# Patient Record
Sex: MALE | Race: BLACK OR AFRICAN AMERICAN | NOT HISPANIC OR LATINO
[De-identification: names, ages, dates, MRNs, and addresses within clinical notes are randomized per-mention and may not be internally consistent; named-entity substitution may affect disease eponyms.]

---

## 2019-01-22 PROBLEM — Z00.00 ENCOUNTER FOR PREVENTIVE HEALTH EXAMINATION: Status: ACTIVE | Noted: 2019-01-22

## 2019-02-07 ENCOUNTER — APPOINTMENT (OUTPATIENT)
Dept: UROLOGY | Facility: CLINIC | Age: 54
End: 2019-02-07
Payer: COMMERCIAL

## 2019-02-07 VITALS
SYSTOLIC BLOOD PRESSURE: 129 MMHG | DIASTOLIC BLOOD PRESSURE: 85 MMHG | BODY MASS INDEX: 27.46 KG/M2 | RESPIRATION RATE: 14 BRPM | HEIGHT: 74 IN | WEIGHT: 214 LBS | HEART RATE: 67 BPM

## 2019-02-07 DIAGNOSIS — Z87.448 PERSONAL HISTORY OF OTHER DISEASES OF URINARY SYSTEM: ICD-10-CM

## 2019-02-07 DIAGNOSIS — Z78.9 OTHER SPECIFIED HEALTH STATUS: ICD-10-CM

## 2019-02-07 DIAGNOSIS — R80.9 PROTEINURIA, UNSPECIFIED: ICD-10-CM

## 2019-02-07 DIAGNOSIS — Z87.891 PERSONAL HISTORY OF NICOTINE DEPENDENCE: ICD-10-CM

## 2019-02-07 LAB
BILIRUB UR QL STRIP: NORMAL
CLARITY UR: CLEAR
COLLECTION METHOD: NORMAL
GLUCOSE UR-MCNC: NORMAL
HCG UR QL: 0.2 EU/DL
HGB UR QL STRIP.AUTO: NORMAL
KETONES UR-MCNC: NORMAL
LEUKOCYTE ESTERASE UR QL STRIP: NORMAL
NITRITE UR QL STRIP: NORMAL
PH UR STRIP: 7.5
PROT UR STRIP-MCNC: 100
SP GR UR STRIP: 1.01

## 2019-02-07 PROCEDURE — 51798 US URINE CAPACITY MEASURE: CPT

## 2019-02-07 PROCEDURE — 81003 URINALYSIS AUTO W/O SCOPE: CPT | Mod: QW

## 2019-02-07 PROCEDURE — 99204 OFFICE O/P NEW MOD 45 MIN: CPT | Mod: 25

## 2019-02-07 NOTE — LETTER BODY
[Dear  ___] : Dear  [unfilled], [Consult Letter:] : I had the pleasure of evaluating your patient, [unfilled]. [( Thank you for referring [unfilled] for consultation for _____ )] : Thank you for referring [unfilled] for consultation for [unfilled] [Please see my note below.] : Please see my note below. [Consult Closing:] : Thank you very much for allowing me to participate in the care of this patient.  If you have any questions, please do not hesitate to contact me. [Sincerely,] : Sincerely, [FreeTextEntry3] : Brad Jones MD\par  of Urology\par [facility]\par \par Offices:\par [facility] @\par [address], [place] [address]\par and\par [address], [place] [address], [address]\par and\par [address] [address]\par \par TEL: [phone]\par FAX: [phone]

## 2019-02-07 NOTE — PHYSICAL EXAM
[General Appearance - Well Developed] : well developed [Normal Appearance] : normal appearance [General Appearance - In No Acute Distress] : no acute distress [] : no respiratory distress [Abdomen Soft] : soft [Abdomen Tenderness] : non-tender [Abdomen Mass (___ Cm)] : no abdominal mass palpated [Costovertebral Angle Tenderness] : no ~M costovertebral angle tenderness [Urethral Meatus] : meatus normal [Penis Abnormality] : normal uncircumcised penis [Scrotum] : the scrotum was normal [Epididymis] : the epididymides were normal [Testes Tenderness] : no tenderness of the testes [Testes Mass (___cm)] : there were no testicular masses [FreeTextEntry1] : Prostate partially palpated, non tender, no nodule in the palpated part  [Normal Station and Gait] : the gait and station were normal for the patient's age [Skin Color & Pigmentation] : normal skin color and pigmentation [No Focal Deficits] : no focal deficits [Oriented To Time, Place, And Person] : oriented to person, place, and time [No Palpable Adenopathy] : no palpable adenopathy

## 2019-02-07 NOTE — HISTORY OF PRESENT ILLNESS
[FreeTextEntry1] : 54 yo male presents for Microhematuria. \par Recently had urine test and was told has microscopic hematuria. \par Denies gross hematuria, no history of kidney stones or recurrent urinary tract infections. \par Smoker- past, 0.25 PPD for few years, quit- 28 years ago. \par Occupational exposure- no. \par Reports reasonable stream, urinates every few hours or so during the day depending on fluid: water, coffee/tea and soda intake. No nocturia. \par Denies hesitancy, straining, intermittency, urgency, incontinence, sense of incomplete emptying.\par Denies dysuria, lower abdominal or flank pain, fever, chills or rigors.\par Normal erections(5/5)and libido.\par \par

## 2019-02-07 NOTE — ASSESSMENT
[FreeTextEntry1] : Reviewed outside records. \par \par Benign Prostatic Hyperplasia:\par No bothersome lower urinary tract symptoms and minimal post void residual. \par \par PSA Screening:\par Discussed PSA screening and latest recommendations/guidelines- USPTF and AUA. \par Patient does want to continue PSA screening.\par \par Microhematuria:\par Discussed the differential diagnosis of hematuria including benign and malignant pathology- including but not limited to nephrolithiasis, bladder stone, urinary tract infection, glomerular disease, renal cancer, bladder cancer, prostate cancer. We also discussed the chance that workup will not reveal a source for the bleeding. The patient understands that the hematuria could be from an upper tract (kidney or ureter) or lower tract (bladder, urethra, prostate) and that workup includes imaging and direct visualization of all of these.\par \par Will proceed with work up with Urinalysis with microscopy, Urine culture, Urine cytology, CT Urogram and Cystoscopy. \par \par Return to office after CT scan.

## 2019-02-08 LAB
APPEARANCE: CLEAR
BACTERIA: NEGATIVE
BILIRUBIN URINE: NEGATIVE
BLOOD URINE: NEGATIVE
COLOR: YELLOW
GLUCOSE QUALITATIVE U: NEGATIVE MG/DL
KETONES URINE: NEGATIVE
LEUKOCYTE ESTERASE URINE: NEGATIVE
MICROSCOPIC-UA: NORMAL
NITRITE URINE: NEGATIVE
PH URINE: 7.5
PROTEIN URINE: 30 MG/DL
PSA SERPL-MCNC: 0.86 NG/ML
RED BLOOD CELLS URINE: 0 /HPF
SPECIFIC GRAVITY URINE: 1.01
SQUAMOUS EPITHELIAL CELLS: 0 /HPF
UROBILINOGEN URINE: NEGATIVE MG/DL
WHITE BLOOD CELLS URINE: 0 /HPF

## 2019-02-09 LAB — BACTERIA UR CULT: NORMAL

## 2019-02-19 ENCOUNTER — APPOINTMENT (OUTPATIENT)
Dept: CT IMAGING | Facility: CLINIC | Age: 54
End: 2019-02-19
Payer: COMMERCIAL

## 2019-02-19 ENCOUNTER — OUTPATIENT (OUTPATIENT)
Dept: OUTPATIENT SERVICES | Facility: HOSPITAL | Age: 54
LOS: 1 days | End: 2019-02-19
Payer: COMMERCIAL

## 2019-02-19 DIAGNOSIS — R31.29 OTHER MICROSCOPIC HEMATURIA: ICD-10-CM

## 2019-02-19 PROCEDURE — 74178 CT ABD&PLV WO CNTR FLWD CNTR: CPT | Mod: 26

## 2019-02-19 PROCEDURE — 74178 CT ABD&PLV WO CNTR FLWD CNTR: CPT

## 2019-03-14 ENCOUNTER — APPOINTMENT (OUTPATIENT)
Dept: UROLOGY | Facility: CLINIC | Age: 54
End: 2019-03-14
Payer: MEDICARE

## 2019-03-14 VITALS
HEIGHT: 74 IN | HEART RATE: 71 BPM | BODY MASS INDEX: 27.46 KG/M2 | SYSTOLIC BLOOD PRESSURE: 136 MMHG | WEIGHT: 214 LBS | DIASTOLIC BLOOD PRESSURE: 80 MMHG

## 2019-03-14 DIAGNOSIS — R31.29 OTHER MICROSCOPIC HEMATURIA: ICD-10-CM

## 2019-03-14 PROCEDURE — 52000 CYSTOURETHROSCOPY: CPT

## 2019-03-14 PROCEDURE — 99214 OFFICE O/P EST MOD 30 MIN: CPT | Mod: 25

## 2019-03-14 NOTE — ASSESSMENT
[FreeTextEntry1] : Renal cyst:\par Discussed that Renal cysts are a common finding on routine radiological studies. Autopsy studies in patients over the age of 50 reveal greater than a 50% chance of having at least one simple renal cyst.\par And that renal cysts may be classified as simple or complex depending how they look on imaging. Discussed complexity of renal cysts and its implications as regards malignancy. \par Simple Renal cyst. No further work up is recommended. \par \par PSA Screening:\par PSA within normal limits. \par Continue PSA screening.\par \par Microhematuria:\par Discussed work up of hematuria so far. Recommended Cystoscopy. Discussed risks and benefits. \par Patient agreeable. Informed consent obtained. \par On Cystoscopy today- Mild bilobar prostatic enlargement. \par \par Recommended annual Urinalysis and if persistent repeat work up in 3-5 years or sooner if clinically indicated. \par Non  findings were discussed and asked Pt to discuss them with PCP and respective physicians. Pt was given copy of the CT scan. \par \par Follow with Primary care physician. \par Return to clinic as needed.

## 2019-03-14 NOTE — HISTORY OF PRESENT ILLNESS
[FreeTextEntry1] : 54 yo male presents for follow up> \par No new complaint. \par Denies dysuria, hematuria, lower abdominal or flank pain, fever, chills or rigors. \par \par Initially seen for Microhematuria. \par Denied gross hematuria, no history of kidney stones or recurrent urinary tract infections. \par Smoker- past, 0.25 PPD for few years, quit- 28 years ago. \par Occupational exposure- no. \par Reported reasonable stream, urinates every few hours or so during the day depending on fluid: water, coffee/tea and soda intake. No nocturia. \par Denied hesitancy, straining, intermittency, urgency, incontinence, sense of incomplete emptying.\par \par Normal erections(5/5)and libido.\par \par

## 2020-03-11 ENCOUNTER — APPOINTMENT (OUTPATIENT)
Dept: UROLOGY | Facility: CLINIC | Age: 55
End: 2020-03-11
Payer: COMMERCIAL

## 2020-03-11 VITALS
BODY MASS INDEX: 28.88 KG/M2 | OXYGEN SATURATION: 96 % | SYSTOLIC BLOOD PRESSURE: 125 MMHG | HEART RATE: 68 BPM | RESPIRATION RATE: 14 BRPM | HEIGHT: 74 IN | DIASTOLIC BLOOD PRESSURE: 75 MMHG | WEIGHT: 225 LBS

## 2020-03-11 PROCEDURE — 99214 OFFICE O/P EST MOD 30 MIN: CPT

## 2020-03-11 NOTE — PHYSICAL EXAM
[General Appearance - Well Developed] : well developed [Normal Appearance] : normal appearance [General Appearance - In No Acute Distress] : no acute distress [Abdomen Soft] : soft [Abdomen Tenderness] : non-tender [Abdomen Mass (___ Cm)] : no abdominal mass palpated [Costovertebral Angle Tenderness] : no ~M costovertebral angle tenderness [Skin Color & Pigmentation] : normal skin color and pigmentation [FreeTextEntry1] : normal peripheral circulation  [] : no respiratory distress [Oriented To Time, Place, And Person] : oriented to person, place, and time [Normal Station and Gait] : the gait and station were normal for the patient's age [No Focal Deficits] : no focal deficits

## 2020-03-11 NOTE — HISTORY OF PRESENT ILLNESS
[FreeTextEntry1] : 53 yo male presents for Renal cyst. \par Had Renal Ultrasound for history of Renal cyst and was asked to follow with Urology. \par Reports reasonable stream, urinates every few hours or so during the day depending on fluid: water, coffee/tea and soda intake. Nocturia 0-1 x. \par Denies hesitancy, straining, intermittency, urgency, incontinence, sense of incomplete emptying.\par \par Negative work up Microhematuria including Cystoscopy in March 2019 except Enlarged Prostate and Renal cyst. \par \par Initially seen for Microhematuria. \par Denied gross hematuria, no history of kidney stones or recurrent urinary tract infections. \par Smoker- past, 0.25 PPD for few years, quit- 28 years ago. \par Occupational exposure- no. \par Reported reasonable stream, urinates every few hours or so during the day depending on fluid: water, coffee/tea and soda intake. No nocturia. \par Denied hesitancy, straining, intermittency, urgency, incontinence, sense of incomplete emptying.\par \par Normal erections(5/5)and libido.\par \par

## 2020-03-11 NOTE — LETTER BODY
[Dear  ___] : Dear  [unfilled], [Consult Letter:] : I had the pleasure of evaluating your patient, [unfilled]. [( Thank you for referring [unfilled] for consultation for _____ )] : Thank you for referring [unfilled] for consultation for [unfilled] [Please see my note below.] : Please see my note below. [Consult Closing:] : Thank you very much for allowing me to participate in the care of this patient.  If you have any questions, please do not hesitate to contact me. [Sincerely,] : Sincerely, [FreeTextEntry3] : Brad Jones MD\par  of Urology\par [facility]\par \par Offices:\par [facility] @\par [address], [place] [address]\par and\par [address] [facility], [place] [address], [address]\par and\par [address] [address]\par \par TEL: [phone]\par FAX: [phone]

## 2020-03-11 NOTE — ASSESSMENT
[FreeTextEntry1] : Reviewed outside records. \par Slight increase in size of Renal cyst and being reported septated. \par \par Renal cyst:\par Discussed that Renal cysts are a common finding on routine radiological studies. Autopsy studies in patients over the age of 50 reveal greater than a 50% chance of having at least one simple renal cyst.\par And that renal cysts may be classified as simple or complex depending how they look on imaging. Discussed complexity of renal cysts and its implications as regards malignancy. \par Will monitor with periodic Imaging. \par \par CT scan before follow up appointment.\par Return to office in 6 months or sooner if any issues. \par

## 2020-08-20 ENCOUNTER — APPOINTMENT (OUTPATIENT)
Dept: CT IMAGING | Facility: CLINIC | Age: 55
End: 2020-08-20
Payer: MEDICARE

## 2020-08-20 ENCOUNTER — RESULT REVIEW (OUTPATIENT)
Age: 55
End: 2020-08-20

## 2020-08-20 ENCOUNTER — OUTPATIENT (OUTPATIENT)
Dept: OUTPATIENT SERVICES | Facility: HOSPITAL | Age: 55
LOS: 1 days | End: 2020-08-20
Payer: COMMERCIAL

## 2020-08-20 DIAGNOSIS — Z00.8 ENCOUNTER FOR OTHER GENERAL EXAMINATION: ICD-10-CM

## 2020-08-20 DIAGNOSIS — N28.1 CYST OF KIDNEY, ACQUIRED: ICD-10-CM

## 2020-08-20 PROCEDURE — 74160 CT ABDOMEN W/CONTRAST: CPT | Mod: 26

## 2020-08-20 PROCEDURE — 74160 CT ABDOMEN W/CONTRAST: CPT

## 2020-08-20 PROCEDURE — 82565 ASSAY OF CREATININE: CPT

## 2020-09-16 ENCOUNTER — APPOINTMENT (OUTPATIENT)
Dept: UROLOGY | Facility: CLINIC | Age: 55
End: 2020-09-16
Payer: MEDICARE

## 2020-09-16 VITALS
HEART RATE: 56 BPM | BODY MASS INDEX: 27.46 KG/M2 | WEIGHT: 214 LBS | DIASTOLIC BLOOD PRESSURE: 88 MMHG | SYSTOLIC BLOOD PRESSURE: 145 MMHG | OXYGEN SATURATION: 99 % | HEIGHT: 74 IN

## 2020-09-16 PROCEDURE — 99214 OFFICE O/P EST MOD 30 MIN: CPT

## 2020-09-22 NOTE — PHYSICAL EXAM
[Normal Appearance] : normal appearance [General Appearance - In No Acute Distress] : no acute distress [Skin Color & Pigmentation] : normal skin color and pigmentation [FreeTextEntry1] : normal peripheral circulation  [] : no respiratory distress [Oriented To Time, Place, And Person] : oriented to person, place, and time [Normal Station and Gait] : the gait and station were normal for the patient's age

## 2020-09-22 NOTE — LETTER BODY
[Dear  ___] : Dear  [unfilled], [Courtesy Letter:] : I had the pleasure of seeing your patient, [unfilled], in my office today. [Please see my note below.] : Please see my note below. [Sincerely,] : Sincerely, [FreeTextEntry3] : Brad Jones MD\par  of Urology\par [facility]\par \par Offices:\par [facility] @\par [address], [place] [address]\par and\par [address] [facility], [place] [address], [address]\par and\par [address] [address]\par \par TEL: [phone]\par FAX: [phone]

## 2020-09-22 NOTE — HISTORY OF PRESENT ILLNESS
[FreeTextEntry1] : 56 yo male presents for follow up.\par No new complaint. \par Had CT scan. \par \par Initially seen for Renal cyst. \par Had Renal Ultrasound for history of Renal cyst and was asked to follow with Urology. \par Reports reasonable stream, urinates every few hours or so during the day depending on fluid: water, coffee/tea and soda intake. Nocturia 0-1 x. \par Denies hesitancy, straining, intermittency, urgency, incontinence, sense of incomplete emptying.\par \par Negative work up Microhematuria including Cystoscopy in March 2019 except Enlarged Prostate and Renal cyst. \par \par Initially seen for Microhematuria. \par Denied gross hematuria, no history of kidney stones or recurrent urinary tract infections. \par Smoker- past, 0.25 PPD for few years, quit- 28 years ago. \par Occupational exposure- no. \par Reported reasonable stream, urinates every few hours or so during the day depending on fluid: water, coffee/tea and soda intake. No nocturia. \par Denied hesitancy, straining, intermittency, urgency, incontinence, sense of incomplete emptying.\par \par Normal erections(5/5)and libido.\par \par

## 2020-09-30 ENCOUNTER — TRANSCRIPTION ENCOUNTER (OUTPATIENT)
Age: 55
End: 2020-09-30

## 2021-09-15 ENCOUNTER — APPOINTMENT (OUTPATIENT)
Dept: UROLOGY | Facility: CLINIC | Age: 56
End: 2021-09-15
Payer: MEDICARE

## 2021-09-15 VITALS
SYSTOLIC BLOOD PRESSURE: 139 MMHG | DIASTOLIC BLOOD PRESSURE: 84 MMHG | BODY MASS INDEX: 27.59 KG/M2 | HEIGHT: 74 IN | HEART RATE: 67 BPM | OXYGEN SATURATION: 99 % | WEIGHT: 215 LBS | TEMPERATURE: 97.3 F

## 2021-09-15 DIAGNOSIS — Z12.5 ENCOUNTER FOR SCREENING FOR MALIGNANT NEOPLASM OF PROSTATE: ICD-10-CM

## 2021-09-15 DIAGNOSIS — N28.1 CYST OF KIDNEY, ACQUIRED: ICD-10-CM

## 2021-09-15 DIAGNOSIS — N13.8 BENIGN PROSTATIC HYPERPLASIA WITH LOWER URINARY TRACT SYMPMS: ICD-10-CM

## 2021-09-15 DIAGNOSIS — N40.1 BENIGN PROSTATIC HYPERPLASIA WITH LOWER URINARY TRACT SYMPMS: ICD-10-CM

## 2021-09-15 PROCEDURE — 51741 ELECTRO-UROFLOWMETRY FIRST: CPT

## 2021-09-15 PROCEDURE — 99214 OFFICE O/P EST MOD 30 MIN: CPT | Mod: 25

## 2021-09-15 PROCEDURE — 51798 US URINE CAPACITY MEASURE: CPT

## 2021-09-19 PROBLEM — N28.1 RENAL CYST, ACQUIRED: Status: ACTIVE | Noted: 2019-03-14

## 2021-09-19 PROBLEM — Z12.5 SCREENING PSA (PROSTATE SPECIFIC ANTIGEN): Status: ACTIVE | Noted: 2019-02-07

## 2021-09-19 PROBLEM — N40.1 BPH WITH OBSTRUCTION/LOWER URINARY TRACT SYMPTOMS: Status: ACTIVE | Noted: 2019-02-07

## 2021-09-19 NOTE — HISTORY OF PRESENT ILLNESS
[FreeTextEntry1] : 57 yo male presents for follow up.\par Reports reasonable stream, urinates every 3-4 hours or so during the day. Nocturia 0-1 x. \par Denies hesitancy, straining, intermittency, urgency, incontinence, sense of incomplete emptying.\par Denies dysuria, hematuria, lower abdominal or flank pain, nausea, vomiting, fever, chills or rigors. \par \par Has Renal cyst. Had follow up Ultrasound. PCP ordered MRI. \par \par Negative work up Microhematuria including Cystoscopy in March 2019 except Enlarged Prostate and Renal cyst. \par \par Initially seen for Microhematuria. \par Denied gross hematuria, no history of kidney stones or recurrent urinary tract infections. \par Smoker- past, 0.25 PPD for few years, quit- 28 years ago. \par Occupational exposure- no. \par Reported reasonable stream, urinates every few hours or so during the day depending on fluid: water, coffee/tea and soda intake. No nocturia. \par Denied hesitancy, straining, intermittency, urgency, incontinence, sense of incomplete emptying.\par \par Normal erections(5/5)and libido.\par \par

## 2021-09-19 NOTE — LETTER BODY
[Dear  ___] : Dear  [unfilled], [Courtesy Letter:] : I had the pleasure of seeing your patient, [unfilled], in my office today. [Please see my note below.] : Please see my note below. [FreeTextEntry3] : Brad Jones MD\par  of Urology\par [facility]\par \par Offices:\par [facility] @\par [address], [place] [address]\par and\par [address], [place] [address], [address]\par and\par [address] [address]\par \par TEL: [phone]\par FAX: [phone]  [Sincerely,] : Sincerely,

## 2021-09-19 NOTE — PHYSICAL EXAM
[Urethral Meatus] : meatus normal [Penis Abnormality] : normal uncircumcised penis [Scrotum] : the scrotum was normal [Epididymis] : the epididymides were normal [Testes Tenderness] : no tenderness of the testes [Testes Mass (___cm)] : there were no testicular masses [Normal Appearance] : normal appearance [General Appearance - In No Acute Distress] : no acute distress [Abdomen Soft] : soft [Abdomen Tenderness] : non-tender [Costovertebral Angle Tenderness] : no ~M costovertebral angle tenderness [Skin Color & Pigmentation] : normal skin color and pigmentation [FreeTextEntry1] : normal peripheral circulation  [] : no respiratory distress [Oriented To Time, Place, And Person] : oriented to person, place, and time [Normal Station and Gait] : the gait and station were normal for the patient's age

## 2021-09-19 NOTE — ASSESSMENT
[FreeTextEntry1] : Reviewed outside records. \par PSA: 0.7(Sept 2020)\par \par 9/7/2021	\par EXAM:  ULTRASOUND RENAL\par COMPARISON:  Comparison made with study 3/30/2020\par FINDINGS:  \par \par RIGHT KIDNEY: 11.9 cm L x 5.2 cm AP x 6.3 cm W.\par Normal size, morphology and cortical echotexture. \par No hydronephrosis, shadowing calculi or perinephric fluid. \par Midpole cyst measures 3.8 x 3.6 x 3.6 cm minimally larger than previous examination.\par  \par LEFT KIDNEY: 11.9 cm L x 5.9 cm AP x 6.4 cm W.\par Normal size, morphology and cortical echotexture. \par No hydronephrosis, shadowing calculi or perinephric fluid. \par Midpole complex cyst with possible calcification and internal septation 4.3 x 2.2 x 2.1 cm previously measuring 3.4 x 2.2 x 2.2 cm. Baseline CT or MRI with and without contrast recommended.\par  \par  \par IMPRESSION:  \par 1. Stable midpole right renal cyst minimally larger. No additional imaging recommended.\par 2. Enlarging complex septated and partially calcified mid pole left renal cyst. Follow-up CT or MRI with and without contrast recommended.  \par \par Renal cyst:\par Getting MRI Friday. Will follow and inform results.\par \par PSA Screening:\par Will do PSA with PCP. \par \par Benign Prostatic Hyperplasia:\par See Uroflo and PVR. \par No bothersome lower urinary tract symptoms and minimal post void residual.

## 2021-09-29 ENCOUNTER — NON-APPOINTMENT (OUTPATIENT)
Age: 56
End: 2021-09-29

## 2024-10-02 ENCOUNTER — NON-APPOINTMENT (OUTPATIENT)
Age: 59
End: 2024-10-02

## 2024-10-02 DIAGNOSIS — E78.5 HYPERLIPIDEMIA, UNSPECIFIED: ICD-10-CM

## 2024-10-02 RX ORDER — ATORVASTATIN CALCIUM 20 MG/1
20 TABLET, FILM COATED ORAL
Refills: 0 | Status: ACTIVE | COMMUNITY

## 2024-10-02 RX ORDER — ENALAPRIL MALEATE 5 MG/1
5 TABLET ORAL
Refills: 0 | Status: ACTIVE | COMMUNITY

## 2025-01-07 ENCOUNTER — APPOINTMENT (OUTPATIENT)
Age: 60
End: 2025-01-07
Payer: MEDICARE

## 2025-01-07 VITALS
HEIGHT: 74 IN | DIASTOLIC BLOOD PRESSURE: 75 MMHG | HEART RATE: 66 BPM | SYSTOLIC BLOOD PRESSURE: 125 MMHG | BODY MASS INDEX: 27.21 KG/M2 | WEIGHT: 212 LBS

## 2025-01-07 DIAGNOSIS — N40.1 BENIGN PROSTATIC HYPERPLASIA WITH LOWER URINARY TRACT SYMPMS: ICD-10-CM

## 2025-01-07 DIAGNOSIS — N28.1 CYST OF KIDNEY, ACQUIRED: ICD-10-CM

## 2025-01-07 DIAGNOSIS — R80.9 PROTEINURIA, UNSPECIFIED: ICD-10-CM

## 2025-01-07 DIAGNOSIS — E78.5 HYPERLIPIDEMIA, UNSPECIFIED: ICD-10-CM

## 2025-01-07 DIAGNOSIS — N13.8 BENIGN PROSTATIC HYPERPLASIA WITH LOWER URINARY TRACT SYMPMS: ICD-10-CM

## 2025-01-07 DIAGNOSIS — R31.29 OTHER MICROSCOPIC HEMATURIA: ICD-10-CM

## 2025-01-07 PROCEDURE — 99204 OFFICE O/P NEW MOD 45 MIN: CPT

## 2025-01-10 RX ORDER — KETOCONAZOLE 20 MG/G
2 CREAM TOPICAL
Qty: 60 | Refills: 0 | Status: ACTIVE | COMMUNITY
Start: 2024-11-04

## 2025-01-10 RX ORDER — FINASTERIDE 5 MG/1
5 TABLET, FILM COATED ORAL
Qty: 90 | Refills: 0 | Status: ACTIVE | COMMUNITY
Start: 2024-07-30

## 2025-07-22 ENCOUNTER — APPOINTMENT (OUTPATIENT)
Age: 60
End: 2025-07-22
Payer: MEDICARE

## 2025-07-22 VITALS
HEART RATE: 65 BPM | WEIGHT: 210 LBS | HEIGHT: 74 IN | BODY MASS INDEX: 26.95 KG/M2 | DIASTOLIC BLOOD PRESSURE: 85 MMHG | SYSTOLIC BLOOD PRESSURE: 149 MMHG

## 2025-07-22 DIAGNOSIS — Z12.5 ENCOUNTER FOR SCREENING FOR MALIGNANT NEOPLASM OF PROSTATE: ICD-10-CM

## 2025-07-22 DIAGNOSIS — N13.8 BENIGN PROSTATIC HYPERPLASIA WITH LOWER URINARY TRACT SYMPMS: ICD-10-CM

## 2025-07-22 DIAGNOSIS — E78.5 HYPERLIPIDEMIA, UNSPECIFIED: ICD-10-CM

## 2025-07-22 DIAGNOSIS — N40.1 BENIGN PROSTATIC HYPERPLASIA WITH LOWER URINARY TRACT SYMPMS: ICD-10-CM

## 2025-07-22 DIAGNOSIS — R80.9 PROTEINURIA, UNSPECIFIED: ICD-10-CM

## 2025-07-22 PROCEDURE — 99214 OFFICE O/P EST MOD 30 MIN: CPT

## 2025-08-20 ENCOUNTER — RX RENEWAL (OUTPATIENT)
Age: 60
End: 2025-08-20